# Patient Record
Sex: MALE | Race: WHITE
[De-identification: names, ages, dates, MRNs, and addresses within clinical notes are randomized per-mention and may not be internally consistent; named-entity substitution may affect disease eponyms.]

---

## 2023-05-06 ENCOUNTER — HOSPITAL ENCOUNTER (EMERGENCY)
Dept: HOSPITAL 4 - SED | Age: 28
Discharge: HOME | End: 2023-05-06
Payer: COMMERCIAL

## 2023-05-06 VITALS — WEIGHT: 180 LBS | BODY MASS INDEX: 25.77 KG/M2 | HEIGHT: 70 IN

## 2023-05-06 VITALS — SYSTOLIC BLOOD PRESSURE: 114 MMHG

## 2023-05-06 VITALS — SYSTOLIC BLOOD PRESSURE: 122 MMHG

## 2023-05-06 DIAGNOSIS — K62.5: ICD-10-CM

## 2023-05-06 DIAGNOSIS — K64.9: Primary | ICD-10-CM

## 2023-05-06 DIAGNOSIS — Z79.899: ICD-10-CM

## 2023-05-06 LAB
ALBUMIN SERPL BCP-MCNC: 4.6 G/DL (ref 3.4–4.8)
ALT SERPL W P-5'-P-CCNC: 25 U/L (ref 12–78)
AMYLASE SERPL-CCNC: 118 U/L (ref 0–100)
ANION GAP SERPL CALCULATED.3IONS-SCNC: 11 MMOL/L (ref 5–15)
AST SERPL W P-5'-P-CCNC: 21 U/L (ref 10–37)
BASOPHILS # BLD AUTO: 0 K/UL (ref 0–0.2)
BASOPHILS NFR BLD AUTO: 0.5 % (ref 0–2)
BILIRUB SERPL-MCNC: 1.4 MG/DL (ref 0–1)
BUN SERPL-MCNC: 29 MG/DL (ref 8–21)
CALCIUM SERPL-MCNC: 9.2 MG/DL (ref 8.4–11)
CHLORIDE SERPL-SCNC: 96 MMOL/L (ref 98–107)
CREAT SERPL-MCNC: 1.18 MG/DL (ref 0.55–1.3)
CRP SERPL-MCNC: 0.8 MG/DL (ref 0–0.5)
EOSINOPHIL # BLD AUTO: 0.1 K/UL (ref 0–0.4)
EOSINOPHIL NFR BLD AUTO: 0.7 % (ref 0–4)
ERYTHROCYTE [DISTWIDTH] IN BLOOD BY AUTOMATED COUNT: 13.6 % (ref 9–15)
GFR SERPL CREATININE-BSD FRML MDRD: 96 ML/MIN (ref 90–?)
GLUCOSE SERPL-MCNC: 94 MG/DL (ref 70–99)
HCT VFR BLD AUTO: 45.2 % (ref 36–54)
HGB BLD-MCNC: 15.2 G/DL (ref 14–18)
INR PPP: 1.1 (ref 0.8–1.2)
LIPASE SERPL-CCNC: 67 U/L (ref 73–393)
LYMPHOCYTES # BLD AUTO: 2 K/UL (ref 1–5.5)
LYMPHOCYTES NFR BLD AUTO: 21.6 % (ref 20.5–51.5)
MCH RBC QN AUTO: 29 PG (ref 27–31)
MCHC RBC AUTO-ENTMCNC: 34 % (ref 32–36)
MCV RBC AUTO: 85 FL (ref 79–98)
MONOCYTES # BLD MANUAL: 1.2 K/UL (ref 0–1)
MONOCYTES # BLD MANUAL: 12.7 % (ref 1.7–9.3)
NEUTROPHILS # BLD AUTO: 5.9 K/UL (ref 1.8–7.7)
NEUTROPHILS NFR BLD AUTO: 64.5 % (ref 40–70)
PLATELET # BLD AUTO: 253 K/UL (ref 130–430)
PROTHROMBIN TIME: 11.2 SECS (ref 9.5–12.5)
RBC # BLD AUTO: 5.3 MIL/UL (ref 4.2–6.2)
WBC # BLD AUTO: 9.1 K/UL (ref 4.8–10.8)

## 2023-05-06 NOTE — NUR
PT BIBS FOR C/O RECTAL BLEEDING STARTING LAST NIGHT. PT BROUGHT TO ROOM 7 AND 
ENDORSED TO JAVI SHARMA.

## 2023-05-06 NOTE — NUR
Patient given written and verbal discharge instructions and verbalizes 
understanding.  ER MD DR DIAZ discussed with patient the results and treatment 
provided. Patient in stable condition. ID arm band removed.

Rx of HYDROCORTISONE given. Patient educated on pain management and to follow 
up with PMD. Pain Scale 0/10.

Opportunity for questions provided and answered. Medication side effect fact 
sheet provided.